# Patient Record
Sex: FEMALE | Race: WHITE | Employment: FULL TIME | URBAN - METROPOLITAN AREA
[De-identification: names, ages, dates, MRNs, and addresses within clinical notes are randomized per-mention and may not be internally consistent; named-entity substitution may affect disease eponyms.]

---

## 2017-03-16 ENCOUNTER — TELEPHONE (OUTPATIENT)
Dept: FAMILY MEDICINE CLINIC | Facility: CLINIC | Age: 42
End: 2017-03-16

## 2017-03-16 DIAGNOSIS — I10 ESSENTIAL HYPERTENSION WITH GOAL BLOOD PRESSURE LESS THAN 140/90: Primary | ICD-10-CM

## 2017-03-17 ENCOUNTER — OFFICE VISIT (OUTPATIENT)
Dept: FAMILY MEDICINE CLINIC | Facility: CLINIC | Age: 42
End: 2017-03-17

## 2017-03-17 ENCOUNTER — APPOINTMENT (OUTPATIENT)
Dept: LAB | Age: 42
End: 2017-03-17
Attending: PHYSICIAN ASSISTANT
Payer: COMMERCIAL

## 2017-03-17 VITALS
DIASTOLIC BLOOD PRESSURE: 88 MMHG | TEMPERATURE: 98 F | SYSTOLIC BLOOD PRESSURE: 114 MMHG | WEIGHT: 210 LBS | RESPIRATION RATE: 20 BRPM | HEIGHT: 65 IN | BODY MASS INDEX: 34.99 KG/M2 | HEART RATE: 98 BPM | OXYGEN SATURATION: 98 %

## 2017-03-17 DIAGNOSIS — E78.1 HYPERTRIGLYCERIDEMIA: ICD-10-CM

## 2017-03-17 DIAGNOSIS — E55.9 VITAMIN D DEFICIENCY: ICD-10-CM

## 2017-03-17 DIAGNOSIS — I10 ESSENTIAL HYPERTENSION WITH GOAL BLOOD PRESSURE LESS THAN 140/90: ICD-10-CM

## 2017-03-17 DIAGNOSIS — F41.9 ANXIETY: ICD-10-CM

## 2017-03-17 LAB
25-HYDROXYVITAMIN D (TOTAL): 28.9 NG/ML (ref 30–100)
CHOLEST SMN-MCNC: 208 MG/DL (ref ?–200)
HDLC SERPL-MCNC: 53 MG/DL (ref 45–?)
HDLC SERPL: 3.92 {RATIO} (ref ?–4.44)
LDLC SERPL CALC-MCNC: 120 MG/DL (ref ?–130)
NONHDLC SERPL-MCNC: 155 MG/DL (ref ?–130)
TRIGLYCERIDES: 173 MG/DL (ref ?–150)
VLDL: 35 MG/DL (ref 5–40)

## 2017-03-17 PROCEDURE — 82306 VITAMIN D 25 HYDROXY: CPT

## 2017-03-17 PROCEDURE — 80061 LIPID PANEL: CPT

## 2017-03-17 PROCEDURE — 99213 OFFICE O/P EST LOW 20 MIN: CPT | Performed by: PHYSICIAN ASSISTANT

## 2017-03-17 PROCEDURE — 36415 COLL VENOUS BLD VENIPUNCTURE: CPT

## 2017-03-17 RX ORDER — METOPROLOL SUCCINATE 50 MG/1
TABLET, EXTENDED RELEASE ORAL
Qty: 90 TABLET | Refills: 3 | Status: SHIPPED | OUTPATIENT
Start: 2017-03-17 | End: 2018-03-19

## 2017-03-17 RX ORDER — LEVONORGESTREL / ETHINYL ESTRADIOL AND ETHINYL ESTRADIOL 150-30(84)
1 KIT ORAL DAILY
Qty: 91 TABLET | Refills: 0 | COMMUNITY
Start: 2017-03-17 | End: 2018-01-26 | Stop reason: SDUPTHER

## 2017-03-17 RX ORDER — MAG HYDROX/ALUMINUM HYD/SIMETH 400-400-40
1 SUSPENSION, ORAL (FINAL DOSE FORM) ORAL DAILY
Qty: 30 CAPSULE | Refills: 0 | COMMUNITY
Start: 2017-03-17 | End: 2019-07-16 | Stop reason: ALTCHOICE

## 2017-03-17 NOTE — PROGRESS NOTES
HPI:   Adrian Breaux is a 39year old female who c/o anxiety. States she feels she has had anxiety all her life. Started getting worse with her brother's passing 5 years ago.  States she has always tried to keep the peace in the family, worried about occasional      FAMILY HISTORY:  Psychiatric illness:     REVIEW OF SYSTEMS:   GENERAL: denies weight changes, denies fatigue  SKIN: no rashes  RESPIRATORY: denies shortness of breath   CV: denies chest pain, denies palpitations  GI: denies abdominal pain;

## 2017-09-28 NOTE — PROGRESS NOTES
HPI:   Michelle Brandt is a 39year old female who presents for a complete physical exam. Symptoms: denies discharge, itching, burning or dysuria, periods are regular every 3 months. Sees gyne for well woman exams.       Patient complains of skin tags on Prescriptions:  Metoprolol Succinate ER 50 MG Oral Tablet 24 Hr TAKE 1 TABLET BY MOUTH ONCE DAILY. Disp: 90 tablet Rfl: 3   Levonorgest-Eth Estrad 91-Day (SEASONIQUE) 0.15-0.03 &0.01 MG Oral Tab Take 1 tablet by mouth daily.  Disp: 91 tablet Rfl: 0   Cholec heartburn  : denies dysuria, vaginal discharge or itching, periods regular   MUSCULOSKELETAL: denies back pain  NEURO: denies headaches  PSYCHE: denies depression or anxiety  HEMATOLOGIC: denies hx of anemia  ENDOCRINE: + thyroid history-thyroid nodule, diet, drink plenty of water. 7. Bilateral swelling of feet  Advised drink plenty of water. Compression stockings  Get up and walk around throughout the day  Elevate legs/feet when resting.      Discussed diet, exercise,calcium, vitamin D, fish oil and

## 2017-09-29 ENCOUNTER — OFFICE VISIT (OUTPATIENT)
Dept: FAMILY MEDICINE CLINIC | Facility: CLINIC | Age: 42
End: 2017-09-29

## 2017-09-29 VITALS
BODY MASS INDEX: 36.7 KG/M2 | OXYGEN SATURATION: 99 % | DIASTOLIC BLOOD PRESSURE: 82 MMHG | WEIGHT: 215 LBS | TEMPERATURE: 99 F | HEIGHT: 64 IN | HEART RATE: 68 BPM | RESPIRATION RATE: 20 BRPM | SYSTOLIC BLOOD PRESSURE: 122 MMHG

## 2017-09-29 DIAGNOSIS — R73.01 IMPAIRED FASTING GLUCOSE: ICD-10-CM

## 2017-09-29 DIAGNOSIS — M79.89 BILATERAL SWELLING OF FEET: ICD-10-CM

## 2017-09-29 DIAGNOSIS — Z00.00 LABORATORY EXAM ORDERED AS PART OF ROUTINE GENERAL MEDICAL EXAMINATION: ICD-10-CM

## 2017-09-29 DIAGNOSIS — E66.01 SEVERE OBESITY (BMI 35.0-39.9): ICD-10-CM

## 2017-09-29 DIAGNOSIS — Z00.00 ROUTINE MEDICAL EXAM: Primary | ICD-10-CM

## 2017-09-29 DIAGNOSIS — E04.1 THYROID NODULE: ICD-10-CM

## 2017-09-29 DIAGNOSIS — L91.8 SKIN TAG: ICD-10-CM

## 2017-09-29 PROCEDURE — 99396 PREV VISIT EST AGE 40-64: CPT | Performed by: PHYSICIAN ASSISTANT

## 2017-09-29 PROCEDURE — 99212 OFFICE O/P EST SF 10 MIN: CPT | Performed by: PHYSICIAN ASSISTANT

## 2018-01-06 LAB
ABSOLUTE BASOPHILS: 76 CELLS/UL (ref 0–200)
ABSOLUTE EOSINOPHILS: 151 CELLS/UL (ref 15–500)
ABSOLUTE LYMPHOCYTES: 2915 CELLS/UL (ref 850–3900)
ABSOLUTE MONOCYTES: 504 CELLS/UL (ref 200–950)
ABSOLUTE NEUTROPHILS: 4754 CELLS/UL (ref 1500–7800)
ALBUMIN/GLOBULIN RATIO: 1.6 (CALC) (ref 1–2.5)
ALBUMIN: 4.1 G/DL (ref 3.6–5.1)
ALKALINE PHOSPHATASE: 44 U/L (ref 33–115)
ALT: 13 U/L (ref 6–29)
AST: 12 U/L (ref 10–30)
BASOPHILS: 0.9 %
BILIRUBIN, TOTAL: 0.6 MG/DL (ref 0.2–1.2)
BUN: 10 MG/DL (ref 7–25)
CALCIUM: 9.1 MG/DL (ref 8.6–10.2)
CARBON DIOXIDE: 23 MMOL/L (ref 20–31)
CHLORIDE: 106 MMOL/L (ref 98–110)
CHOL/HDLC RATIO: 4.9 (CALC)
CHOLESTEROL, TOTAL: 237 MG/DL
CREATININE: 0.79 MG/DL (ref 0.5–1.1)
EGFR IF AFRICN AM: 107 ML/MIN/1.73M2
EGFR IF NONAFRICN AM: 92 ML/MIN/1.73M2
EOSINOPHILS: 1.8 %
GLOBULIN: 2.6 G/DL (CALC) (ref 1.9–3.7)
GLUCOSE: 92 MG/DL (ref 65–99)
HDL CHOLESTEROL: 48 MG/DL
HEMATOCRIT: 43.3 % (ref 35–45)
HEMOGLOBIN A1C: 5.4 % OF TOTAL HGB
HEMOGLOBIN: 15.2 G/DL (ref 11.7–15.5)
LDL-CHOLESTEROL: 158 MG/DL (CALC)
LYMPHOCYTES: 34.7 %
MCH: 32.2 PG (ref 27–33)
MCHC: 35.1 G/DL (ref 32–36)
MCV: 91.7 FL (ref 80–100)
MONOCYTES: 6 %
MPV: 8.6 FL (ref 7.5–12.5)
NEUTROPHILS: 56.6 %
NON-HDL CHOLESTEROL: 189 MG/DL (CALC)
PLATELET COUNT: 337 THOUSAND/UL (ref 140–400)
POTASSIUM: 3.8 MMOL/L (ref 3.5–5.3)
PROTEIN, TOTAL: 6.7 G/DL (ref 6.1–8.1)
RDW: 11.8 % (ref 11–15)
RED BLOOD CELL COUNT: 4.72 MILLION/UL (ref 3.8–5.1)
SODIUM: 140 MMOL/L (ref 135–146)
TRIGLYCERIDES: 169 MG/DL
TSH W/REFLEX TO FT4: 2.22 MIU/L
VITAMIN D, 25-OH, TOTAL: 26 NG/ML (ref 30–100)
WHITE BLOOD CELL COUNT: 8.4 THOUSAND/UL (ref 3.8–10.8)

## 2018-01-26 RX ORDER — LEVONORGESTREL / ETHINYL ESTRADIOL AND ETHINYL ESTRADIOL 150-30(84)
1 KIT ORAL DAILY
Qty: 28 TABLET | Refills: 0 | Status: SHIPPED | OUTPATIENT
Start: 2018-01-26 | End: 2018-02-02

## 2018-02-02 ENCOUNTER — OFFICE VISIT (OUTPATIENT)
Dept: FAMILY MEDICINE CLINIC | Facility: CLINIC | Age: 43
End: 2018-02-02

## 2018-02-02 VITALS
WEIGHT: 218 LBS | TEMPERATURE: 99 F | HEIGHT: 65 IN | SYSTOLIC BLOOD PRESSURE: 128 MMHG | DIASTOLIC BLOOD PRESSURE: 80 MMHG | OXYGEN SATURATION: 99 % | RESPIRATION RATE: 22 BRPM | BODY MASS INDEX: 36.32 KG/M2 | HEART RATE: 68 BPM

## 2018-02-02 DIAGNOSIS — Z12.31 ENCOUNTER FOR MAMMOGRAM TO ESTABLISH BASELINE MAMMOGRAM: ICD-10-CM

## 2018-02-02 DIAGNOSIS — L91.8 SKIN TAG: ICD-10-CM

## 2018-02-02 DIAGNOSIS — Z01.419 WELL WOMAN EXAM WITH ROUTINE GYNECOLOGICAL EXAM: Primary | ICD-10-CM

## 2018-02-02 DIAGNOSIS — Z00.00 ANNUAL PHYSICAL EXAM: ICD-10-CM

## 2018-02-02 PROCEDURE — 87624 HPV HI-RISK TYP POOLED RSLT: CPT | Performed by: FAMILY MEDICINE

## 2018-02-02 PROCEDURE — 99396 PREV VISIT EST AGE 40-64: CPT | Performed by: FAMILY MEDICINE

## 2018-02-02 PROCEDURE — 88175 CYTOPATH C/V AUTO FLUID REDO: CPT | Performed by: FAMILY MEDICINE

## 2018-02-02 PROCEDURE — 11200 RMVL SKIN TAGS UP TO&INC 15: CPT | Performed by: FAMILY MEDICINE

## 2018-02-02 RX ORDER — LEVONORGESTREL / ETHINYL ESTRADIOL AND ETHINYL ESTRADIOL 150-30(84)
1 KIT ORAL DAILY
Qty: 84 TABLET | Refills: 3 | Status: SHIPPED | OUTPATIENT
Start: 2018-02-02 | End: 2018-05-04 | Stop reason: WASHOUT

## 2018-02-02 NOTE — PROGRESS NOTES
HPI:   Stella Diaz is a 43year old female who presents for a complete physical exam.       Wt Readings from Last 6 Encounters:  02/02/18 : 218 lb  09/29/17 : 215 lb  03/17/17 : 210 lb  11/18/16 : 206 lb  10/21/16 : 208 lb 3.2 oz  09/09/16 : 206 lb Diagnosis Date   • Acute frontal sinusitis    • Alopecia, unspecified    • Backache, unspecified    • Disturbance of skin sensation    • Hypertrophy of breast    • Other seborrheic keratosis    • Pain in joint, shoulder region    • PCOS (polycystic ovari well developed, well nourished,in no apparent distress  CARDIO: RRR without murmur  LUNGS: clear to auscultation  NECK: supple,no adenopathy,no thyromegaly  HEENT: atraumatic, normocephalic,ears and throat are clear  EYES:PERRLA, EOMI, normal,conjunctiva a given  Followup 1 year or sooner if needed        Discussed diet, exercise,calcium, vitamin D, fish oil and self breast exams. Questions answered and patient indicates understanding of these issues and agrees to the plan.   Follow up in 1 year or sooner if

## 2018-02-05 LAB — HPV I/H RISK 1 DNA SPEC QL NAA+PROBE: NEGATIVE

## 2018-02-07 ENCOUNTER — PATIENT MESSAGE (OUTPATIENT)
Dept: FAMILY MEDICINE CLINIC | Facility: CLINIC | Age: 43
End: 2018-02-07

## 2018-02-07 LAB
LAST PAP RESULT: NORMAL
PAP HISTORY (OTHER THAN LAST PAP): NORMAL

## 2018-02-08 NOTE — TELEPHONE ENCOUNTER
From: Sinai Wilkerson  To: Basilio Foley DO  Sent: 2/7/2018 6:40 PM CST  Subject: Non-Urgent Medical Question    I was reviewing my history on the medical hx page. I noticed a past dx of a chlamydial infection? ??   As far as I know, I have never been d

## 2018-03-19 DIAGNOSIS — I10 ESSENTIAL HYPERTENSION WITH GOAL BLOOD PRESSURE LESS THAN 140/90: ICD-10-CM

## 2018-03-19 RX ORDER — METOPROLOL SUCCINATE 50 MG/1
TABLET, EXTENDED RELEASE ORAL
Qty: 90 TABLET | Refills: 2 | Status: SHIPPED | OUTPATIENT
Start: 2018-03-19 | End: 2018-12-21

## 2018-04-23 RX ORDER — LEVONORGESTREL AND ETHINYL ESTRADIOL AND ETHINYL ESTRADIOL 150-30(84)
KIT ORAL
Qty: 91 TABLET | Refills: 0 | Status: SHIPPED | OUTPATIENT
Start: 2018-04-23 | End: 2018-07-17

## 2018-07-17 RX ORDER — LEVONORGESTREL AND ETHINYL ESTRADIOL AND ETHINYL ESTRADIOL 150-30(84)
KIT ORAL
Qty: 91 TABLET | Refills: 0 | Status: SHIPPED | OUTPATIENT
Start: 2018-07-17 | End: 2018-10-04

## 2018-10-05 RX ORDER — LEVONORGESTREL / ETHINYL ESTRADIOL AND ETHINYL ESTRADIOL 150-30(84)
KIT ORAL
Qty: 91 TABLET | Refills: 0 | Status: SHIPPED | OUTPATIENT
Start: 2018-10-05 | End: 2019-01-08

## 2018-10-16 ENCOUNTER — OFFICE VISIT (OUTPATIENT)
Dept: FAMILY MEDICINE CLINIC | Facility: CLINIC | Age: 43
End: 2018-10-16

## 2018-10-16 VITALS
RESPIRATION RATE: 16 BRPM | OXYGEN SATURATION: 98 % | WEIGHT: 215 LBS | DIASTOLIC BLOOD PRESSURE: 86 MMHG | TEMPERATURE: 99 F | SYSTOLIC BLOOD PRESSURE: 118 MMHG | HEART RATE: 88 BPM | HEIGHT: 65 IN | BODY MASS INDEX: 35.82 KG/M2

## 2018-10-16 DIAGNOSIS — R19.7 DIARRHEA, UNSPECIFIED TYPE: Primary | ICD-10-CM

## 2018-10-16 PROCEDURE — 99213 OFFICE O/P EST LOW 20 MIN: CPT | Performed by: FAMILY MEDICINE

## 2018-10-16 NOTE — PROGRESS NOTES
HPI:   Azalea Tobias is a 37year old female who presents with diarrhea     Pt on day 21 of her diarrhea  Cramping only before going to the bathroom  Diet does not make a difference  No blood   Pt feels run down   Pt feels weak  Pt drinking gatorade a GENERAL: feels well otherwise  SKIN: denies any unusual skin lesions  EYES:denies blurred vision or double vision  HEENT: denies nasal congestion, sinus pain or ST  LUNGS: denies shortness of breath with exertion  CARDIOVASCULAR: denies chest pain on exe

## 2018-11-09 ENCOUNTER — OFFICE VISIT (OUTPATIENT)
Dept: FAMILY MEDICINE CLINIC | Facility: CLINIC | Age: 43
End: 2018-11-09

## 2018-11-09 VITALS
SYSTOLIC BLOOD PRESSURE: 126 MMHG | DIASTOLIC BLOOD PRESSURE: 88 MMHG | RESPIRATION RATE: 16 BRPM | HEART RATE: 74 BPM | WEIGHT: 212 LBS | OXYGEN SATURATION: 98 % | TEMPERATURE: 98 F | BODY MASS INDEX: 36.19 KG/M2 | HEIGHT: 64 IN

## 2018-11-09 DIAGNOSIS — R25.2 LEG CRAMPS: ICD-10-CM

## 2018-11-09 DIAGNOSIS — K52.9 CHRONIC DIARRHEA: Primary | ICD-10-CM

## 2018-11-09 PROCEDURE — 99214 OFFICE O/P EST MOD 30 MIN: CPT | Performed by: FAMILY MEDICINE

## 2018-11-09 RX ORDER — DICYCLOMINE HYDROCHLORIDE 10 MG/1
10 CAPSULE ORAL 4 TIMES DAILY
Qty: 120 CAPSULE | Refills: 0 | Status: SHIPPED | OUTPATIENT
Start: 2018-11-09 | End: 2018-12-14 | Stop reason: ALTCHOICE

## 2018-11-09 NOTE — PROGRESS NOTES
HPI:   Grace Felty is a 37year old female who presents with chronic diarrhea     Pt on day 46 of her diarrhea  Normal cultures  No better with the cipro  Pt will go 6-8 x per day   Some leg cramping   Pt will see gi 11/30    Cramping only before go use: No      Alcohol/week: 0.0 oz    Drug use: No    Occ: . : . 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    Exercise: minimal.  Diet: watches minimally     REVIEW OF SYSTEMS:   GENERAL: feels well otherwise  SKIN: denies any unusual skin lesions  EYES:denies

## 2018-11-10 ENCOUNTER — PATIENT MESSAGE (OUTPATIENT)
Dept: FAMILY MEDICINE CLINIC | Facility: CLINIC | Age: 43
End: 2018-11-10

## 2018-11-12 ENCOUNTER — TELEPHONE (OUTPATIENT)
Dept: FAMILY MEDICINE CLINIC | Facility: CLINIC | Age: 43
End: 2018-11-12

## 2018-11-12 RX ORDER — POTASSIUM CHLORIDE 750 MG/1
10 TABLET, EXTENDED RELEASE ORAL DAILY
Qty: 5 TABLET | Refills: 0 | Status: SHIPPED | OUTPATIENT
Start: 2018-11-12 | End: 2018-11-17

## 2018-11-12 NOTE — TELEPHONE ENCOUNTER
Pt called returning a call for the nurse regarding her test results. Please call pt back and advise.

## 2018-11-13 ENCOUNTER — TELEPHONE (OUTPATIENT)
Dept: FAMILY MEDICINE CLINIC | Facility: CLINIC | Age: 43
End: 2018-11-13

## 2018-11-13 DIAGNOSIS — E87.6 LOW SERUM POTASSIUM: ICD-10-CM

## 2018-11-13 DIAGNOSIS — R74.01 ELEVATED ALT MEASUREMENT: Primary | ICD-10-CM

## 2018-11-13 NOTE — TELEPHONE ENCOUNTER
----- Message from Victor Hugo Warner DO sent at 11/10/2018 10:32 AM CST -----  Potassium low ; start kdur 10meq daily for 5 days   Repeat level one week  One liver marker slightly elevated; check abd u/s

## 2018-12-13 ENCOUNTER — HOSPITAL ENCOUNTER (OUTPATIENT)
Dept: GENERAL RADIOLOGY | Age: 43
Discharge: HOME | End: 2018-12-13
Attending: INTERNAL MEDICINE
Payer: COMMERCIAL

## 2018-12-13 ENCOUNTER — OFFICE VISIT (OUTPATIENT)
Dept: FAMILY MEDICINE CLINIC | Facility: CLINIC | Age: 43
End: 2018-12-13

## 2018-12-13 ENCOUNTER — TELEPHONE (OUTPATIENT)
Dept: FAMILY MEDICINE CLINIC | Facility: CLINIC | Age: 43
End: 2018-12-13

## 2018-12-13 VITALS
SYSTOLIC BLOOD PRESSURE: 128 MMHG | HEIGHT: 64 IN | OXYGEN SATURATION: 99 % | WEIGHT: 212 LBS | DIASTOLIC BLOOD PRESSURE: 78 MMHG | HEART RATE: 76 BPM | RESPIRATION RATE: 18 BRPM | BODY MASS INDEX: 36.19 KG/M2 | TEMPERATURE: 98 F

## 2018-12-13 DIAGNOSIS — M23.92 ACUTE INTERNAL DERANGEMENT OF LEFT KNEE: Primary | ICD-10-CM

## 2018-12-13 DIAGNOSIS — M23.92 ACUTE INTERNAL DERANGEMENT OF LEFT KNEE: ICD-10-CM

## 2018-12-13 PROCEDURE — 99214 OFFICE O/P EST MOD 30 MIN: CPT | Performed by: INTERNAL MEDICINE

## 2018-12-13 PROCEDURE — 73560 X-RAY EXAM OF KNEE 1 OR 2: CPT | Performed by: INTERNAL MEDICINE

## 2018-12-13 RX ORDER — HYDROCODONE BITARTRATE AND ACETAMINOPHEN 5; 325 MG/1; MG/1
TABLET ORAL
Qty: 20 TABLET | Refills: 0 | Status: SHIPPED | OUTPATIENT
Start: 2018-12-13 | End: 2019-04-12 | Stop reason: ALTCHOICE

## 2018-12-13 NOTE — PROGRESS NOTES
Radha Bowman is a 37year old female. HPI:   Here with left knee pain x 3 weeks. No injury. Initially hurt when she got out of bed, bending her leg to put shoes on \"kills\". Has been struggling to get out of bed or the car due to pain.   Yesterday better  GI: denies N/V/D; denies abdominal pain    NEURO: denies weakness; denies numbness or tingling    EXAM:   /78   Pulse 76   Temp 98.2 °F (36.8 °C) (Oral)   Resp 18   Ht 64\"   Wt 212 lb   LMP 10/22/2018   SpO2 99%   BMI 36.39 kg/m²   GENERAL:

## 2018-12-13 NOTE — TELEPHONE ENCOUNTER
Pt calling to let Heather Alejandra know that they can see her at the ortho tomorrow at 2:30  She was supposed to let Heather Alejandra know so they could process the referral quicker  Please advise

## 2018-12-21 DIAGNOSIS — I10 ESSENTIAL HYPERTENSION WITH GOAL BLOOD PRESSURE LESS THAN 140/90: ICD-10-CM

## 2018-12-21 RX ORDER — METOPROLOL SUCCINATE 50 MG/1
TABLET, EXTENDED RELEASE ORAL
Qty: 90 TABLET | Refills: 1 | Status: SHIPPED | OUTPATIENT
Start: 2018-12-21 | End: 2019-03-18

## 2019-01-08 ENCOUNTER — HOSPITAL ENCOUNTER (OUTPATIENT)
Dept: MRI IMAGING | Age: 44
Discharge: HOME | End: 2019-01-08
Attending: ORTHOPAEDIC SURGERY
Payer: COMMERCIAL

## 2019-01-08 DIAGNOSIS — M25.562 LEFT MEDIAL KNEE PAIN: ICD-10-CM

## 2019-01-08 DIAGNOSIS — M25.462 EFFUSION OF LEFT KNEE: ICD-10-CM

## 2019-01-08 PROCEDURE — 73721 MRI JNT OF LWR EXTRE W/O DYE: CPT | Performed by: ORTHOPAEDIC SURGERY

## 2019-01-08 NOTE — PROGRESS NOTES
Minor meniscal findings but no significant tear. Surgery typically not needed.   See me if still painful

## 2019-01-09 RX ORDER — LEVONORGESTREL / ETHINYL ESTRADIOL AND ETHINYL ESTRADIOL 150-30(84)
1 KIT ORAL
Qty: 91 TABLET | Refills: 0 | Status: SHIPPED | OUTPATIENT
Start: 2019-01-09 | End: 2019-03-17

## 2019-03-18 RX ORDER — LEVONORGESTREL / ETHINYL ESTRADIOL AND ETHINYL ESTRADIOL 150-30(84)
1 KIT ORAL
Qty: 91 TABLET | Refills: 0 | Status: SHIPPED | OUTPATIENT
Start: 2019-03-18 | End: 2019-06-11

## 2019-05-14 ENCOUNTER — OFFICE VISIT (OUTPATIENT)
Dept: FAMILY MEDICINE CLINIC | Facility: CLINIC | Age: 44
End: 2019-05-14
Payer: COMMERCIAL

## 2019-05-14 VITALS
HEART RATE: 69 BPM | RESPIRATION RATE: 18 BRPM | BODY MASS INDEX: 37.22 KG/M2 | OXYGEN SATURATION: 99 % | DIASTOLIC BLOOD PRESSURE: 76 MMHG | WEIGHT: 218 LBS | HEIGHT: 64 IN | SYSTOLIC BLOOD PRESSURE: 120 MMHG

## 2019-05-14 DIAGNOSIS — N76.0 ACUTE VAGINITIS: Primary | ICD-10-CM

## 2019-05-14 PROCEDURE — 87660 TRICHOMONAS VAGIN DIR PROBE: CPT | Performed by: INTERNAL MEDICINE

## 2019-05-14 PROCEDURE — 87591 N.GONORRHOEAE DNA AMP PROB: CPT | Performed by: INTERNAL MEDICINE

## 2019-05-14 PROCEDURE — 87510 GARDNER VAG DNA DIR PROBE: CPT | Performed by: INTERNAL MEDICINE

## 2019-05-14 PROCEDURE — 87480 CANDIDA DNA DIR PROBE: CPT | Performed by: INTERNAL MEDICINE

## 2019-05-14 PROCEDURE — 99213 OFFICE O/P EST LOW 20 MIN: CPT | Performed by: INTERNAL MEDICINE

## 2019-05-14 PROCEDURE — 87491 CHLMYD TRACH DNA AMP PROBE: CPT | Performed by: INTERNAL MEDICINE

## 2019-05-14 RX ORDER — FLUCONAZOLE 150 MG/1
150 TABLET ORAL ONCE
Qty: 1 TABLET | Refills: 0 | Status: SHIPPED | OUTPATIENT
Start: 2019-05-14 | End: 2019-05-20

## 2019-05-14 RX ORDER — NYSTATIN 100000 U/G
1 CREAM TOPICAL 2 TIMES DAILY PRN
Qty: 30 G | Refills: 0 | Status: SHIPPED | OUTPATIENT
Start: 2019-05-14 | End: 2020-02-21

## 2019-05-14 NOTE — PROGRESS NOTES
HPI:     Britney Velez is a 37year old female who presents with vaginal discharge for 1 weeks. - no burning with urination  - no pelvic pain  - no new partner  - nothing for contraception    Pain with sex recently. Tried OTC yeast med but not helpful. adjusted when results return. Wali Mckeesport was given an opportunity to ask questions and verbalized understanding. Follow up if not better.

## 2019-05-24 ENCOUNTER — OFFICE VISIT (OUTPATIENT)
Dept: FAMILY MEDICINE CLINIC | Facility: CLINIC | Age: 44
End: 2019-05-24
Payer: COMMERCIAL

## 2019-05-24 VITALS
OXYGEN SATURATION: 98 % | DIASTOLIC BLOOD PRESSURE: 78 MMHG | BODY MASS INDEX: 37.1 KG/M2 | RESPIRATION RATE: 16 BRPM | HEART RATE: 73 BPM | TEMPERATURE: 98 F | WEIGHT: 220 LBS | SYSTOLIC BLOOD PRESSURE: 112 MMHG | HEIGHT: 64.5 IN

## 2019-05-24 DIAGNOSIS — N89.8 VAGINAL IRRITATION: ICD-10-CM

## 2019-05-24 DIAGNOSIS — Z01.419 WELL WOMAN EXAM WITH ROUTINE GYNECOLOGICAL EXAM: Primary | ICD-10-CM

## 2019-05-24 DIAGNOSIS — Z00.00 ANNUAL PHYSICAL EXAM: ICD-10-CM

## 2019-05-24 DIAGNOSIS — Z12.31 ENCOUNTER FOR SCREENING MAMMOGRAM FOR HIGH-RISK PATIENT: ICD-10-CM

## 2019-05-24 PROCEDURE — 99396 PREV VISIT EST AGE 40-64: CPT | Performed by: FAMILY MEDICINE

## 2019-05-24 PROCEDURE — 87660 TRICHOMONAS VAGIN DIR PROBE: CPT | Performed by: FAMILY MEDICINE

## 2019-05-24 PROCEDURE — 87480 CANDIDA DNA DIR PROBE: CPT | Performed by: FAMILY MEDICINE

## 2019-05-24 PROCEDURE — 87510 GARDNER VAG DNA DIR PROBE: CPT | Performed by: FAMILY MEDICINE

## 2019-05-24 NOTE — PROGRESS NOTES
HPI:   Emerald Pretty is a 37year old female who presents for a complete physical exam.       Wt Readings from Last 6 Encounters:  05/24/19 : 220 lb  05/14/19 : 218 lb  04/12/19 : 220 lb  12/14/18 : 212 lb  12/13/18 : 212 lb  11/09/18 : 212 lb    Body Diagnosis Date   • Acute frontal sinusitis    • Alopecia, unspecified    • Backache, unspecified    • Disturbance of skin sensation    • Hypertrophy of breast    • Other seborrheic keratosis    • Pain in joint, shoulder region    • PCOS (polycystic ovari distress  CARDIO: RRR without murmur  LUNGS: clear to auscultation  NECK: supple,no adenopathy,no thyromegaly  HEENT: atraumatic, normocephalic,ears and throat are clear  EYES:PERRLA, EOMI, normal,conjunctiva are clear  SKIN: norashes,no suspicious lesions

## 2019-06-10 DIAGNOSIS — I10 ESSENTIAL HYPERTENSION WITH GOAL BLOOD PRESSURE LESS THAN 140/90: ICD-10-CM

## 2019-06-10 RX ORDER — METOPROLOL SUCCINATE 50 MG/1
TABLET, EXTENDED RELEASE ORAL
Qty: 90 TABLET | Refills: 0 | Status: SHIPPED | OUTPATIENT
Start: 2019-06-10 | End: 2019-09-03

## 2019-06-11 RX ORDER — LEVONORGESTREL / ETHINYL ESTRADIOL AND ETHINYL ESTRADIOL 150-30(84)
KIT ORAL
Qty: 91 TABLET | Refills: 3 | Status: SHIPPED | OUTPATIENT
Start: 2019-06-11 | End: 2020-01-31

## 2019-06-11 NOTE — TELEPHONE ENCOUNTER
Rx Request  Levonorgest-Eth Estrad 91-Day (CAMRESE) 0.15-0.03 &0.01 MG Oral Tab    Disp:      91              R: 0    Last Visit: 05/24/2019    Last Refilled: 03/18/2019    Protocol Passed?  Tony Ramires  ]       No[  x]

## 2019-07-16 ENCOUNTER — OFFICE VISIT (OUTPATIENT)
Dept: FAMILY MEDICINE CLINIC | Facility: CLINIC | Age: 44
End: 2019-07-16
Payer: COMMERCIAL

## 2019-07-16 VITALS
RESPIRATION RATE: 20 BRPM | DIASTOLIC BLOOD PRESSURE: 76 MMHG | BODY MASS INDEX: 37.27 KG/M2 | OXYGEN SATURATION: 98 % | HEIGHT: 64.5 IN | SYSTOLIC BLOOD PRESSURE: 116 MMHG | HEART RATE: 74 BPM | WEIGHT: 221 LBS | TEMPERATURE: 98 F

## 2019-07-16 DIAGNOSIS — L02.214 ABSCESS OF GROIN, RIGHT: Primary | ICD-10-CM

## 2019-07-16 PROCEDURE — 99213 OFFICE O/P EST LOW 20 MIN: CPT | Performed by: INTERNAL MEDICINE

## 2019-07-16 PROCEDURE — 96372 THER/PROPH/DIAG INJ SC/IM: CPT | Performed by: INTERNAL MEDICINE

## 2019-07-16 RX ORDER — LIDOCAINE HYDROCHLORIDE 10 MG/ML
1 INJECTION, SOLUTION INFILTRATION; PERINEURAL ONCE
Status: COMPLETED | OUTPATIENT
Start: 2019-07-16 | End: 2019-07-16

## 2019-07-16 RX ORDER — SULFAMETHOXAZOLE AND TRIMETHOPRIM 800; 160 MG/1; MG/1
1 TABLET ORAL 2 TIMES DAILY
Qty: 20 TABLET | Refills: 0 | Status: SHIPPED | OUTPATIENT
Start: 2019-07-16 | End: 2019-07-26

## 2019-07-16 RX ORDER — CEFTRIAXONE 500 MG/1
500 INJECTION, POWDER, FOR SOLUTION INTRAMUSCULAR; INTRAVENOUS ONCE
Status: COMPLETED | OUTPATIENT
Start: 2019-07-16 | End: 2019-07-16

## 2019-07-16 RX ADMIN — LIDOCAINE HYDROCHLORIDE 1 ML: 10 INJECTION, SOLUTION INFILTRATION; PERINEURAL at 19:23:00

## 2019-07-16 RX ADMIN — CEFTRIAXONE 500 MG: 500 INJECTION, POWDER, FOR SOLUTION INTRAMUSCULAR; INTRAVENOUS at 16:25:00

## 2019-07-17 NOTE — PROGRESS NOTES
Onur Patient is a 37year old female. HPI:   Here with an abscess in her right groin since June. Has had them before- last time was 3 years ago. Labs up to date, blood sugars are always in normal range.   Uses antibacterial soaps      Current Outpat edema    ASSESSMENT AND PLAN:   Abscess of groin, right  (primary encounter diagnosis)   - moist heat compresses  - rocephin 500mg x 1 today in the office, pt tolerated this well  - bactrim DS x 10 days    No orders of the defined types were placed in this

## 2019-08-23 ENCOUNTER — TELEPHONE (OUTPATIENT)
Dept: FAMILY MEDICINE CLINIC | Facility: CLINIC | Age: 44
End: 2019-08-23

## 2019-08-23 NOTE — TELEPHONE ENCOUNTER
[employer] [facility] requesting paperwork for today.     Put in [facility] Triage

## 2019-09-03 DIAGNOSIS — I10 ESSENTIAL HYPERTENSION WITH GOAL BLOOD PRESSURE LESS THAN 140/90: ICD-10-CM

## 2019-09-03 RX ORDER — METOPROLOL SUCCINATE 50 MG/1
TABLET, EXTENDED RELEASE ORAL
Qty: 90 TABLET | Refills: 0 | Status: SHIPPED | OUTPATIENT
Start: 2019-09-03 | End: 2019-12-05

## 2019-09-27 ENCOUNTER — OFFICE VISIT (OUTPATIENT)
Dept: FAMILY MEDICINE CLINIC | Facility: CLINIC | Age: 44
End: 2019-09-27
Payer: COMMERCIAL

## 2019-09-27 ENCOUNTER — APPOINTMENT (OUTPATIENT)
Dept: LAB | Age: 44
End: 2019-09-27
Attending: FAMILY MEDICINE
Payer: COMMERCIAL

## 2019-09-27 VITALS
DIASTOLIC BLOOD PRESSURE: 72 MMHG | RESPIRATION RATE: 18 BRPM | HEART RATE: 68 BPM | SYSTOLIC BLOOD PRESSURE: 112 MMHG | HEIGHT: 64.5 IN | WEIGHT: 223 LBS | TEMPERATURE: 98 F | BODY MASS INDEX: 37.61 KG/M2 | OXYGEN SATURATION: 98 %

## 2019-09-27 DIAGNOSIS — R11.0 NAUSEA: ICD-10-CM

## 2019-09-27 DIAGNOSIS — R10.11 RUQ ABDOMINAL PAIN: Primary | ICD-10-CM

## 2019-09-27 LAB
ALBUMIN SERPL-MCNC: 3.6 G/DL (ref 3.4–5)
ALBUMIN/GLOB SERPL: 1 {RATIO} (ref 1–2)
ALP LIVER SERPL-CCNC: 39 U/L (ref 37–98)
ALT SERPL-CCNC: 21 U/L (ref 13–56)
ANION GAP SERPL CALC-SCNC: 5 MMOL/L (ref 0–18)
AST SERPL-CCNC: 15 U/L (ref 15–37)
BASOPHILS # BLD AUTO: 0.06 X10(3) UL (ref 0–0.2)
BASOPHILS NFR BLD AUTO: 0.7 %
BILIRUB SERPL-MCNC: 0.4 MG/DL (ref 0.1–2)
BUN BLD-MCNC: 8 MG/DL (ref 7–18)
BUN/CREAT SERPL: 9.9 (ref 10–20)
CALCIUM BLD-MCNC: 8.7 MG/DL (ref 8.5–10.1)
CHLORIDE SERPL-SCNC: 111 MMOL/L (ref 98–112)
CO2 SERPL-SCNC: 24 MMOL/L (ref 21–32)
CREAT BLD-MCNC: 0.81 MG/DL (ref 0.55–1.02)
DEPRECATED RDW RBC AUTO: 41.1 FL (ref 35.1–46.3)
EOSINOPHIL # BLD AUTO: 0.17 X10(3) UL (ref 0–0.7)
EOSINOPHIL NFR BLD AUTO: 2 %
ERYTHROCYTE [DISTWIDTH] IN BLOOD BY AUTOMATED COUNT: 11.9 % (ref 11–15)
GLOBULIN PLAS-MCNC: 3.7 G/DL (ref 2.8–4.4)
GLUCOSE BLD-MCNC: 84 MG/DL (ref 70–99)
HCT VFR BLD AUTO: 45.9 % (ref 35–48)
HGB BLD-MCNC: 15.8 G/DL (ref 12–16)
IMM GRANULOCYTES # BLD AUTO: 0.03 X10(3) UL (ref 0–1)
IMM GRANULOCYTES NFR BLD: 0.4 %
LIPASE SERPL-CCNC: 271 U/L (ref 73–393)
LYMPHOCYTES # BLD AUTO: 2.93 X10(3) UL (ref 1–4)
LYMPHOCYTES NFR BLD AUTO: 34.7 %
M PROTEIN MFR SERPL ELPH: 7.3 G/DL (ref 6.4–8.2)
MCH RBC QN AUTO: 32.4 PG (ref 26–34)
MCHC RBC AUTO-ENTMCNC: 34.4 G/DL (ref 31–37)
MCV RBC AUTO: 94.3 FL (ref 80–100)
MONOCYTES # BLD AUTO: 0.6 X10(3) UL (ref 0.1–1)
MONOCYTES NFR BLD AUTO: 7.1 %
NEUTROPHILS # BLD AUTO: 4.65 X10 (3) UL (ref 1.5–7.7)
NEUTROPHILS # BLD AUTO: 4.65 X10(3) UL (ref 1.5–7.7)
NEUTROPHILS NFR BLD AUTO: 55.1 %
OSMOLALITY SERPL CALC.SUM OF ELEC: 288 MOSM/KG (ref 275–295)
PLATELET # BLD AUTO: 359 10(3)UL (ref 150–450)
POTASSIUM SERPL-SCNC: 4 MMOL/L (ref 3.5–5.1)
RBC # BLD AUTO: 4.87 X10(6)UL (ref 3.8–5.3)
SODIUM SERPL-SCNC: 140 MMOL/L (ref 136–145)
WBC # BLD AUTO: 8.4 X10(3) UL (ref 4–11)

## 2019-09-27 PROCEDURE — 99213 OFFICE O/P EST LOW 20 MIN: CPT | Performed by: FAMILY MEDICINE

## 2019-09-27 PROCEDURE — 83690 ASSAY OF LIPASE: CPT | Performed by: FAMILY MEDICINE

## 2019-09-27 PROCEDURE — 85025 COMPLETE CBC W/AUTO DIFF WBC: CPT | Performed by: FAMILY MEDICINE

## 2019-09-27 PROCEDURE — 80053 COMPREHEN METABOLIC PANEL: CPT | Performed by: FAMILY MEDICINE

## 2019-09-27 PROCEDURE — 36415 COLL VENOUS BLD VENIPUNCTURE: CPT | Performed by: FAMILY MEDICINE

## 2019-09-27 RX ORDER — OMEPRAZOLE 40 MG/1
40 CAPSULE, DELAYED RELEASE ORAL DAILY
Qty: 14 CAPSULE | Refills: 0 | Status: SHIPPED | OUTPATIENT
Start: 2019-09-27 | End: 2019-11-13

## 2019-09-27 NOTE — PROGRESS NOTES
HPI:    Patient ID: Julianne Trevizo is a 37year old female. Pt states that the past couple months she has been experiencing right abdominal pain and nausea with her most severe case being in June.  States that the nausea and pain normally do not occur omeprazole for two weeks  - CBC WITH DIFFERENTIAL WITH PLATELET  - COMP METABOLIC PANEL (14)  - LIPASE  - US ABDOMEN COMPLETE (CPT=76700); Future  - Omeprazole 40 MG Oral Capsule Delayed Release; Take 1 capsule (40 mg total) by mouth daily.   Dispense: 14 c

## 2019-10-25 ENCOUNTER — HOSPITAL ENCOUNTER (OUTPATIENT)
Dept: ULTRASOUND IMAGING | Age: 44
Discharge: HOME | End: 2019-10-25
Attending: FAMILY MEDICINE
Payer: COMMERCIAL

## 2019-10-25 DIAGNOSIS — R10.11 RUQ ABDOMINAL PAIN: ICD-10-CM

## 2019-10-25 DIAGNOSIS — R11.0 NAUSEA: ICD-10-CM

## 2019-10-25 PROCEDURE — 76700 US EXAM ABDOM COMPLETE: CPT | Performed by: FAMILY MEDICINE

## 2019-11-08 ENCOUNTER — HOSPITAL ENCOUNTER (OUTPATIENT)
Dept: MRI IMAGING | Facility: HOSPITAL | Age: 44
Discharge: HOME | End: 2019-11-08
Attending: FAMILY MEDICINE
Payer: COMMERCIAL

## 2019-11-08 DIAGNOSIS — R16.0 LIVER MASS: ICD-10-CM

## 2019-11-08 DIAGNOSIS — R10.11 RUQ ABDOMINAL PAIN: ICD-10-CM

## 2019-11-08 DIAGNOSIS — R11.0 NAUSEA: ICD-10-CM

## 2019-11-08 PROCEDURE — A9581 GADOXETATE DISODIUM INJ: HCPCS | Performed by: FAMILY MEDICINE

## 2019-11-08 PROCEDURE — 74183 MRI ABD W/O CNTR FLWD CNTR: CPT | Performed by: FAMILY MEDICINE

## 2019-11-22 ENCOUNTER — APPOINTMENT (OUTPATIENT)
Dept: LAB | Age: 44
End: 2019-11-22
Payer: COMMERCIAL

## 2019-11-22 DIAGNOSIS — K81.9 CHOLECYSTITIS: ICD-10-CM

## 2019-11-22 PROCEDURE — 36415 COLL VENOUS BLD VENIPUNCTURE: CPT

## 2019-11-22 PROCEDURE — 93005 ELECTROCARDIOGRAM TRACING: CPT

## 2019-11-22 PROCEDURE — 85027 COMPLETE CBC AUTOMATED: CPT

## 2019-11-22 PROCEDURE — 93010 ELECTROCARDIOGRAM REPORT: CPT | Performed by: INTERNAL MEDICINE

## 2019-11-25 ENCOUNTER — ANESTHESIA EVENT (OUTPATIENT)
Dept: SURGERY | Facility: HOSPITAL | Age: 44
End: 2019-11-25
Payer: COMMERCIAL

## 2019-11-27 ENCOUNTER — TELEPHONE (OUTPATIENT)
Dept: FAMILY MEDICINE CLINIC | Facility: CLINIC | Age: 44
End: 2019-11-27

## 2019-12-02 ENCOUNTER — ANESTHESIA (OUTPATIENT)
Dept: SURGERY | Facility: HOSPITAL | Age: 44
End: 2019-12-02
Payer: COMMERCIAL

## 2019-12-02 ENCOUNTER — HOSPITAL ENCOUNTER (OUTPATIENT)
Facility: HOSPITAL | Age: 44
Setting detail: HOSPITAL OUTPATIENT SURGERY
Discharge: HOME | End: 2019-12-02
Attending: SURGERY | Admitting: SURGERY
Payer: COMMERCIAL

## 2019-12-02 VITALS
TEMPERATURE: 97 F | OXYGEN SATURATION: 94 % | HEART RATE: 58 BPM | SYSTOLIC BLOOD PRESSURE: 109 MMHG | HEIGHT: 64 IN | DIASTOLIC BLOOD PRESSURE: 71 MMHG | BODY MASS INDEX: 37.63 KG/M2 | WEIGHT: 220.44 LBS | RESPIRATION RATE: 16 BRPM

## 2019-12-02 DIAGNOSIS — K81.9 CHOLECYSTITIS: Primary | ICD-10-CM

## 2019-12-02 PROCEDURE — 81025 URINE PREGNANCY TEST: CPT | Performed by: SURGERY

## 2019-12-02 PROCEDURE — 88304 TISSUE EXAM BY PATHOLOGIST: CPT | Performed by: SURGERY

## 2019-12-02 PROCEDURE — 0FT44ZZ RESECTION OF GALLBLADDER, PERCUTANEOUS ENDOSCOPIC APPROACH: ICD-10-PCS | Performed by: SURGERY

## 2019-12-02 RX ORDER — ONDANSETRON 2 MG/ML
4 INJECTION INTRAMUSCULAR; INTRAVENOUS AS NEEDED
Status: DISCONTINUED | OUTPATIENT
Start: 2019-12-02 | End: 2019-12-02

## 2019-12-02 RX ORDER — HYDROCODONE BITARTRATE AND ACETAMINOPHEN 10; 325 MG/1; MG/1
2 TABLET ORAL AS NEEDED
Status: DISCONTINUED | OUTPATIENT
Start: 2019-12-02 | End: 2019-12-02

## 2019-12-02 RX ORDER — LIDOCAINE HYDROCHLORIDE AND EPINEPHRINE 10; 10 MG/ML; UG/ML
INJECTION, SOLUTION INFILTRATION; PERINEURAL AS NEEDED
Status: DISCONTINUED | OUTPATIENT
Start: 2019-12-02 | End: 2019-12-02 | Stop reason: HOSPADM

## 2019-12-02 RX ORDER — SCOLOPAMINE TRANSDERMAL SYSTEM 1 MG/1
PATCH, EXTENDED RELEASE TRANSDERMAL
Status: DISCONTINUED
Start: 2019-12-02 | End: 2019-12-02

## 2019-12-02 RX ORDER — ACETAMINOPHEN 500 MG
TABLET ORAL
Status: DISCONTINUED
Start: 2019-12-02 | End: 2019-12-02

## 2019-12-02 RX ORDER — SCOLOPAMINE TRANSDERMAL SYSTEM 1 MG/1
1 PATCH, EXTENDED RELEASE TRANSDERMAL
Status: DISCONTINUED | OUTPATIENT
Start: 2019-12-02 | End: 2019-12-02

## 2019-12-02 RX ORDER — LIDOCAINE HYDROCHLORIDE 10 MG/ML
INJECTION, SOLUTION EPIDURAL; INFILTRATION; INTRACAUDAL; PERINEURAL AS NEEDED
Status: DISCONTINUED | OUTPATIENT
Start: 2019-12-02 | End: 2019-12-02 | Stop reason: SURG

## 2019-12-02 RX ORDER — ACETAMINOPHEN 500 MG
500 TABLET ORAL EVERY 6 HOURS PRN
Status: DISCONTINUED | OUTPATIENT
Start: 2019-12-02 | End: 2019-12-02

## 2019-12-02 RX ORDER — ONDANSETRON 2 MG/ML
INJECTION INTRAMUSCULAR; INTRAVENOUS
Status: COMPLETED
Start: 2019-12-02 | End: 2019-12-02

## 2019-12-02 RX ORDER — METOCLOPRAMIDE HYDROCHLORIDE 5 MG/ML
10 INJECTION INTRAMUSCULAR; INTRAVENOUS AS NEEDED
Status: DISCONTINUED | OUTPATIENT
Start: 2019-12-02 | End: 2019-12-02

## 2019-12-02 RX ORDER — SODIUM CHLORIDE, SODIUM LACTATE, POTASSIUM CHLORIDE, CALCIUM CHLORIDE 600; 310; 30; 20 MG/100ML; MG/100ML; MG/100ML; MG/100ML
INJECTION, SOLUTION INTRAVENOUS CONTINUOUS
Status: DISCONTINUED | OUTPATIENT
Start: 2019-12-02 | End: 2019-12-02

## 2019-12-02 RX ORDER — DEXAMETHASONE SODIUM PHOSPHATE 4 MG/ML
VIAL (ML) INJECTION AS NEEDED
Status: DISCONTINUED | OUTPATIENT
Start: 2019-12-02 | End: 2019-12-02 | Stop reason: SURG

## 2019-12-02 RX ORDER — ONDANSETRON 2 MG/ML
INJECTION INTRAMUSCULAR; INTRAVENOUS AS NEEDED
Status: DISCONTINUED | OUTPATIENT
Start: 2019-12-02 | End: 2019-12-02 | Stop reason: SURG

## 2019-12-02 RX ORDER — ROCURONIUM BROMIDE 10 MG/ML
INJECTION, SOLUTION INTRAVENOUS AS NEEDED
Status: DISCONTINUED | OUTPATIENT
Start: 2019-12-02 | End: 2019-12-02 | Stop reason: SURG

## 2019-12-02 RX ORDER — METOCLOPRAMIDE HYDROCHLORIDE 5 MG/ML
INJECTION INTRAMUSCULAR; INTRAVENOUS
Status: COMPLETED
Start: 2019-12-02 | End: 2019-12-02

## 2019-12-02 RX ORDER — ACETAMINOPHEN 500 MG
1000 TABLET ORAL ONCE
Status: DISCONTINUED | OUTPATIENT
Start: 2019-12-02 | End: 2019-12-02 | Stop reason: HOSPADM

## 2019-12-02 RX ORDER — NALOXONE HYDROCHLORIDE 0.4 MG/ML
80 INJECTION, SOLUTION INTRAMUSCULAR; INTRAVENOUS; SUBCUTANEOUS AS NEEDED
Status: DISCONTINUED | OUTPATIENT
Start: 2019-12-02 | End: 2019-12-02

## 2019-12-02 RX ORDER — GLYCOPYRROLATE 0.2 MG/ML
INJECTION, SOLUTION INTRAMUSCULAR; INTRAVENOUS AS NEEDED
Status: DISCONTINUED | OUTPATIENT
Start: 2019-12-02 | End: 2019-12-02 | Stop reason: SURG

## 2019-12-02 RX ORDER — BUPIVACAINE HYDROCHLORIDE 5 MG/ML
INJECTION, SOLUTION EPIDURAL; INTRACAUDAL AS NEEDED
Status: DISCONTINUED | OUTPATIENT
Start: 2019-12-02 | End: 2019-12-02 | Stop reason: HOSPADM

## 2019-12-02 RX ORDER — TRAMADOL HYDROCHLORIDE 50 MG/1
50 TABLET ORAL EVERY 6 HOURS PRN
Qty: 20 TABLET | Refills: 0 | Status: SHIPPED | OUTPATIENT
Start: 2019-12-02 | End: 2019-12-11

## 2019-12-02 RX ORDER — KETOROLAC TROMETHAMINE 30 MG/ML
INJECTION, SOLUTION INTRAMUSCULAR; INTRAVENOUS AS NEEDED
Status: DISCONTINUED | OUTPATIENT
Start: 2019-12-02 | End: 2019-12-02 | Stop reason: SURG

## 2019-12-02 RX ORDER — HYDROCODONE BITARTRATE AND ACETAMINOPHEN 10; 325 MG/1; MG/1
1 TABLET ORAL AS NEEDED
Status: DISCONTINUED | OUTPATIENT
Start: 2019-12-02 | End: 2019-12-02

## 2019-12-02 RX ORDER — NEOSTIGMINE METHYLSULFATE 1 MG/ML
INJECTION INTRAVENOUS AS NEEDED
Status: DISCONTINUED | OUTPATIENT
Start: 2019-12-02 | End: 2019-12-02 | Stop reason: SURG

## 2019-12-02 RX ORDER — HYDROMORPHONE HYDROCHLORIDE 1 MG/ML
0.4 INJECTION, SOLUTION INTRAMUSCULAR; INTRAVENOUS; SUBCUTANEOUS EVERY 5 MIN PRN
Status: DISCONTINUED | OUTPATIENT
Start: 2019-12-02 | End: 2019-12-02

## 2019-12-02 RX ADMIN — GLYCOPYRROLATE 0.8 MG: 0.2 INJECTION, SOLUTION INTRAMUSCULAR; INTRAVENOUS at 10:36:00

## 2019-12-02 RX ADMIN — ROCURONIUM BROMIDE 45 MG: 10 INJECTION, SOLUTION INTRAVENOUS at 10:04:00

## 2019-12-02 RX ADMIN — NEOSTIGMINE METHYLSULFATE 4 MG: 1 INJECTION INTRAVENOUS at 10:36:00

## 2019-12-02 RX ADMIN — DEXAMETHASONE SODIUM PHOSPHATE 4 MG: 4 MG/ML VIAL (ML) INJECTION at 10:15:00

## 2019-12-02 RX ADMIN — ONDANSETRON 4 MG: 2 INJECTION INTRAMUSCULAR; INTRAVENOUS at 10:15:00

## 2019-12-02 RX ADMIN — LIDOCAINE HYDROCHLORIDE 50 MG: 10 INJECTION, SOLUTION EPIDURAL; INFILTRATION; INTRACAUDAL; PERINEURAL at 10:04:00

## 2019-12-02 RX ADMIN — KETOROLAC TROMETHAMINE 30 MG: 30 INJECTION, SOLUTION INTRAMUSCULAR; INTRAVENOUS at 10:35:00

## 2019-12-02 NOTE — OPERATIVE REPORT
OPERATIVE REPORT   PREOPERATIVE DIAGNOSIS:  CHOLECYSTITIS     POSTOPERATIVE DIAGNOSIS: CHOLECYSTITIS    PROCEDURE PERFORMED: Laparoscopic cholecystectomy. ASSISTANT:  ASHELY Harvey    INTRAOPERATIVE FINDINGS: Cholelithiasis, cholecystitis.    DESCRIPTION OF

## 2019-12-02 NOTE — ANESTHESIA PREPROCEDURE EVALUATION
PRE-OP EVALUATION    Patient Name: Allie Daly    Pre-op Diagnosis: Cholecystitis [K81.9]    Procedure(s):  LAPAROSCOPIC CHOLECYSTECTOMY, POSSIBLE OPEN    Surgeon(s) and Role:     Noe Tello MD - Primary    Pre-op vitals reviewed.   Temp: 99 EXPLORATORY OF ABDOMEN  2010    Bartholin cyst   • LAPAROSCOPIC OVARIAN CYSTECTOMY Bilateral 2/18/2015    Performed by David Douglas MD at [facility] MAIN OR   • [address], 2015   • OTHER  1/1/95    laparoscopy   • OTHER SURGICAL HISTORY

## 2019-12-02 NOTE — ANESTHESIA POSTPROCEDURE EVALUATION
[address] Patient Status:  Hospital Outpatient Surgery   Age/Gender 40year old female MRN [id number]   [facility] Attending Jose F Campbell, [address] [facility] [address] Day [address] 0 PCP Florencio Bustos DO       Anesthesia Post-

## 2019-12-02 NOTE — ANESTHESIA PROCEDURE NOTES
Airway  Date/Time: 12/2/2019 10:06 AM  Urgency: elective    Airway not difficult    General Information and Staff    Patient location during procedure: OR  Anesthesiologist: Rommel Carr MD  Performed: anesthesiologist     Indications and Patient Con

## 2019-12-02 NOTE — H&P
History & Physical Examination    Patient Name: Earlean Eisenmenger  MRN: [id number]  [facility]: [id number]  YOB: 1975    Diagnosis: cholecystitis    metFORMIN HCl 500 MG Oral Tab, Take 750 mg by mouth 2 (two) times daily with meals.  Used for PCOS Ryder Cortes   • Unspecified essential hypertension    • Unspecified sinusitis (chronic)    • Visual impairment     glasses as needed   • Wears glasses Kid   • Weight gain 2004     Past Surgical History:   Procedure Laterality Date   • BREAST SURGERY PROCEDURE

## 2019-12-05 DIAGNOSIS — I10 ESSENTIAL HYPERTENSION WITH GOAL BLOOD PRESSURE LESS THAN 140/90: ICD-10-CM

## 2019-12-05 RX ORDER — METOPROLOL SUCCINATE 50 MG/1
TABLET, EXTENDED RELEASE ORAL
Qty: 90 TABLET | Refills: 0 | Status: SHIPPED | OUTPATIENT
Start: 2019-12-05 | End: 2020-02-21

## 2020-02-21 PROBLEM — E78.1 HYPERTRIGLYCERIDEMIA: Status: RESOLVED | Noted: 2017-03-17 | Resolved: 2020-02-21

## 2020-02-21 PROBLEM — E66.9 OBESITY (BMI 30-39.9): Status: ACTIVE | Noted: 2020-02-21

## 2020-02-21 PROBLEM — K76.0 FATTY LIVER DISEASE, NONALCOHOLIC: Status: ACTIVE | Noted: 2020-02-21

## 2020-03-01 DIAGNOSIS — I10 ESSENTIAL HYPERTENSION WITH GOAL BLOOD PRESSURE LESS THAN 140/90: ICD-10-CM

## 2020-03-02 RX ORDER — METOPROLOL SUCCINATE 50 MG/1
TABLET, EXTENDED RELEASE ORAL
Qty: 90 TABLET | Refills: 0 | OUTPATIENT
Start: 2020-03-02

## 2020-04-24 PROBLEM — K59.03 DRUG-INDUCED CONSTIPATION: Status: ACTIVE | Noted: 2020-04-24

## 2020-04-27 ENCOUNTER — VIRTUAL PHONE E/M (OUTPATIENT)
Dept: FAMILY MEDICINE CLINIC | Facility: CLINIC | Age: 45
End: 2020-04-27
Payer: COMMERCIAL

## 2020-04-27 DIAGNOSIS — K62.89 RECTAL PAIN: Primary | ICD-10-CM

## 2020-04-27 PROCEDURE — 99213 OFFICE O/P EST LOW 20 MIN: CPT | Performed by: INTERNAL MEDICINE

## 2020-04-27 NOTE — PROGRESS NOTES
Virtual Telephone Check-In    Brian Cole verbally consents to a Virtual/Telephone Check-In visit on 04/27/20.     Patient understands and accepts financial responsibility for any deductible, co-insurance and/or co-pays associated with this service Leg swelling 2010   • Nausea R/t gallbladder   • Other seborrheic keratosis    • Pain in joint, shoulder region    • PCOS (polycystic ovarian syndrome)    • PONV (postoperative nausea and vomiting)    • Problems with swallowing 2011   • Sleep disturbance 2

## 2020-09-18 PROBLEM — E66.3 OVERWEIGHT (BMI 25.0-29.9): Status: ACTIVE | Noted: 2020-09-18

## 2020-09-18 PROBLEM — E66.9 OBESITY (BMI 30-39.9): Status: RESOLVED | Noted: 2020-02-21 | Resolved: 2020-09-18

## 2023-07-18 ENCOUNTER — PATIENT OUTREACH (OUTPATIENT)
Dept: CASE MANAGEMENT | Age: 48
End: 2023-07-18

## 2023-07-18 NOTE — PROCEDURES
The office order for PCP removal request is Approved and finalized on July 18, 2023.     Thanks,  [facility] [employer]

## (undated) DEVICE — GAUZE SPONGES,USP TYPE VII GAUZE, 12 PLY: Brand: CURITY

## (undated) DEVICE — CHLORAPREP 26ML APPLICATOR

## (undated) DEVICE — GAMMEX® NON-LATEX PI ORTHO SIZE 8.5, STERILE POLYISOPRENE POWDER-FREE SURGICAL GLOVE: Brand: GAMMEX

## (undated) DEVICE — TROCARS: Brand: KII® BALLOON BLUNT TIP SYSTEM

## (undated) DEVICE — DISPOSABLE LAPAROSCOPIC CLIP APPLIER WITH 20 CLIPS.: Brand: EPIX® UNIVERSAL CLIP APPLIER

## (undated) DEVICE — 3M(TM) TEGADERM(TM) TRANSPARENT FILM DRESSING FRAME STYLE 9505W: Brand: 3M™ TEGADERM™

## (undated) DEVICE — VIOLET BRAIDED (POLYGLACTIN 910), SYNTHETIC ABSORBABLE SUTURE: Brand: COATED VICRYL

## (undated) DEVICE — UNDYED BRAIDED (POLYGLACTIN 910), SYNTHETIC ABSORBABLE SUTURE: Brand: COATED VICRYL

## (undated) DEVICE — TROCAR: Brand: KII FIOS FIRST ENTRY

## (undated) DEVICE — KENDALL SCD EXPRESS SLEEVES, KNEE LENGTH, MEDIUM: Brand: KENDALL SCD

## (undated) DEVICE — 3M™ TEGADERM™ TRANSPARENT FILM DRESSING, 1626W, 4 IN X 4-3/4 IN (10 CM X 12 CM), 50 EACH/CARTON, 4 CARTON/CASE: Brand: 3M™ TEGADERM™

## (undated) DEVICE — TISSUE RETRIEVAL SYSTEM: Brand: INZII RETRIEVAL SYSTEM

## (undated) DEVICE — LAP CHOLE/APPY CDS-LF: Brand: MEDLINE INDUSTRIES, INC.

## (undated) NOTE — LETTER
06/28/19        [address]   [place] [address]      Dear Ricki Lewis,    [address] [facility] records indicate that you have outstanding lab work and or testing that was ordered for you and has not yet been completed:  Orders Placed This Encoun

## (undated) NOTE — MR AVS SNAPSHOT
[address]  [address] [facility], [address] [address] [phone]               Thank you for choosing us for your health care visit with [facility].   We are glad to serve you and happy to provide you with this [address] [phone], [address] [place] [address], [address] [place] [address] Drive     Phone:  [phone]    - Metoprolol Succinate ER 50 MG Tb24            MyChart     Visit MyChart  You can access your MyChart to more actively manage your health care and view 2 ½ hours per week – spread out over time Use a mario to keep you motivated   Don’t forget strength training with weights and resistance Set goals and track your progress   You don’t need to join a gym. Home exercises work great.  Put more priority on exe

## (undated) NOTE — LETTER
12/13/2018    Gary Ramos        To Whom It May Concern:        Blaisetanner Richard was seen and treated in my office today. Please excuse them from work  from 12/13/18 to 12/20/18.  She is being evaluated with imaging and a referral to an Orthop

## (undated) NOTE — LETTER
Marlyn Ruiz [address] [address]  [place], [address] [facility]    Consent for Operation  Date: __________________                                Time: _______________    1.  I authorize the performance upon [facility] the following operation:  Proc procedure has been videotaped, the surgeon will obtain the original videotape. The hospital will not be responsible for storage or maintenance of this tape.   7. For the purpose of advancing medical education, I consent to the admittance of observers to the STATEMENTS REQUIRING INSERTION OR COMPLETION WERE FILLED IN.     Signature of Patient:   ___________________________    When the patient is a minor or mentally incompetent to give consent:  Signature of person authorized to consent for patient: ____________ supplements, and pills I can buy without a prescription (including street drugs/illegal medications). Failure to inform my anesthesiologist about these medicines may increase my risk of anesthetic complications. iv.  If I am allergic to anything or have ha Anesthesiologist Signature     Date   Time  I have discussed the procedure and information above with the patient (or patient’s representative) and answered their questions. The patient or their representative has agreed to have anesthesia services.     ___

## (undated) NOTE — LETTER
03/05/18        [address] [address]      Dear Anette,    [address] [facility] records indicate that you have outstanding lab work and or testing that was ordered for you and has not yet been completed:          Vitamin D, 25-Hydroxy

## (undated) NOTE — LETTER
12/13/18        [address] [address]      Dear Anette,    [address] [facility] records indicate that you have outstanding lab work and or testing that was ordered for you and has not yet been completed:  Orders Placed This Encounter